# Patient Record
Sex: FEMALE | Race: WHITE | NOT HISPANIC OR LATINO | Employment: STUDENT | URBAN - METROPOLITAN AREA
[De-identification: names, ages, dates, MRNs, and addresses within clinical notes are randomized per-mention and may not be internally consistent; named-entity substitution may affect disease eponyms.]

---

## 2024-04-23 NOTE — TELEPHONE ENCOUNTER
DIAGNOSIS: Chronic Knee Pain while running for the past 3 years, both knees-Left is worse, [employer] [place]   APPOINTMENT DATE: 4/252/2024    Action    Action Taken 4/23/2024 9:53AM CONI    I called pt Paulina - unavailable. I called again- unavailable       NOTES STATUS DETAILS   OFFICE NOTE from referring provider  Self Referred

## 2024-04-25 ENCOUNTER — PRE VISIT (OUTPATIENT)
Dept: ORTHOPEDICS | Facility: CLINIC | Age: 19
End: 2024-04-25
Payer: COMMERCIAL

## 2024-04-25 ENCOUNTER — OFFICE VISIT (OUTPATIENT)
Dept: ORTHOPEDICS | Facility: CLINIC | Age: 19
End: 2024-04-25
Payer: COMMERCIAL

## 2024-04-25 VITALS — BODY MASS INDEX: 22.08 KG/M2 | HEIGHT: 62 IN | WEIGHT: 120 LBS

## 2024-04-25 DIAGNOSIS — M25.562 BILATERAL KNEE PAIN: Primary | ICD-10-CM

## 2024-04-25 DIAGNOSIS — M76.32 ILIOTIBIAL BAND FRICTION SYNDROME OF BOTH KNEES: Primary | ICD-10-CM

## 2024-04-25 DIAGNOSIS — M76.31 ILIOTIBIAL BAND FRICTION SYNDROME OF BOTH KNEES: Primary | ICD-10-CM

## 2024-04-25 DIAGNOSIS — M25.561 BILATERAL KNEE PAIN: Primary | ICD-10-CM

## 2024-04-25 PROCEDURE — 99203 OFFICE O/P NEW LOW 30 MIN: CPT | Mod: GC | Performed by: FAMILY MEDICINE

## 2024-04-25 NOTE — LETTER
4/25/2024      RE: Paulina Herrera  [address] [place] [address]     Dear Colleague,    Thank you for referring your patient, Paulina Herrera, to the [facility] [place]. Please see a copy of my visit note below.    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 I agree with the findings and plan of care as documented in this note.    Torsten Hernandez MD  April 25, 2024  3:25 PM        Pt is a 19 year old female here today for:     HPI:   Bilateral Knee pain : located on the lateral side; L>R. She currently runs 3x a week, 2-3 miles (up to 6) at a time. Gets new shoes every 6 months, wears [employer].   Duration? On and off for the past three years   Injury/ Inciting activity? No specific injury. Running makes it worse, resting makes it better   Pop? Sometimes when she stretches afterwards   Swelling? none   Limited motion? Sometimes feels stiff   Locking/ Catching? None   Giving way/ instability? None   Imaging? None   Treatment? Has tried heat/ice which only mildly improves     No past medical history on file.   No past surgical history on file.   No current outpatient medications on file.      Not on File   ROS:   Gen- no fevers/chills   Rheum - no morning stiffness   Derm - no rash/ redness   Neuro - no numbness, no tingling   Remainder of ROS negative.     Exam:   There were no vitals taken for this visit.       Bilateral Knees:   ROM: 0-130; Crepitus: no   Effusion: no ; Swelling: no   Strength: Full in flexion/ extension   Tenderness: Posterior lateral  area- YES, bilateral; Patella - no Medial joint line - no; Lateral joint line - no; Quad tendon - no; Patellar tendon- no; Hamstring - no; +TTP at lateral femoral condyle  Cruciates: anterior drawer - neg/posterior drawer -neg. Lachman - neg   Collaterals: varus -neg/valgus -YES on lateral side, bilateral.   Patella: patellar compression - neg; single leg bend- neg   Meniscus: Payton - neg; Thessaly - neg   Maneuvers: Kilo - POS for tenderness but flexible         Again, thank you for allowing me to participate in the care of your patient.      Sincerely,    Torsten Hernandez MD

## 2024-04-25 NOTE — PROGRESS NOTES
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 I agree with the findings and plan of care as documented in this note.    Torsten Hernandez MD  April 25, 2024  3:25 PM        Pt is a 19 year old female here today for:     HPI:   Bilateral Knee pain : located on the lateral side; L>R. She currently runs 3x a week, 2-3 miles (up to 6) at a time. Gets new shoes every 6 months, wears Ascics.   Duration? On and off for the past three years   Injury/ Inciting activity? No specific injury. Running makes it worse, resting makes it better   Pop? Sometimes when she stretches afterwards   Swelling? none   Limited motion? Sometimes feels stiff   Locking/ Catching? None   Giving way/ instability? None   Imaging? None   Treatment? Has tried heat/ice which only mildly improves     No past medical history on file.   No past surgical history on file.   No current outpatient medications on file.      Not on File   ROS:   Gen- no fevers/chills   Rheum - no morning stiffness   Derm - no rash/ redness   Neuro - no numbness, no tingling   Remainder of ROS negative.     Exam:   There were no vitals taken for this visit.       Bilateral Knees:   ROM: 0-130; Crepitus: no   Effusion: no ; Swelling: no   Strength: Full in flexion/ extension   Tenderness: Posterior lateral area- YES, bilateral; Patella - no Medial joint line - no; Lateral joint line - no; Quad tendon - no; Patellar tendon- no; Hamstring - no; +TTP at lateral femoral condyle  Cruciates: anterior drawer - neg/posterior drawer -neg. Lachman - neg   Collaterals: varus  -neg/valgus -YES on lateral side, bilateral.   Patella: patellar compression - neg; single leg bend- neg   Meniscus: Payton - neg; Thessaly - neg   Maneuvers: Kilo - POS for tenderness but flexible